# Patient Record
Sex: MALE | Race: BLACK OR AFRICAN AMERICAN | NOT HISPANIC OR LATINO | Employment: PART TIME | URBAN - METROPOLITAN AREA
[De-identification: names, ages, dates, MRNs, and addresses within clinical notes are randomized per-mention and may not be internally consistent; named-entity substitution may affect disease eponyms.]

---

## 2023-03-02 PROBLEM — N50.819 PERSISTENT TESTICULAR PAIN: Status: ACTIVE | Noted: 2023-03-02

## 2023-03-02 PROBLEM — Z98.2 VP (VENTRICULOPERITONEAL) SHUNT STATUS: Status: ACTIVE | Noted: 2023-03-02

## 2023-03-02 PROBLEM — A64 STI (SEXUALLY TRANSMITTED INFECTION): Status: ACTIVE | Noted: 2023-03-02

## 2023-03-02 PROBLEM — R35.89 POLYURIA: Status: ACTIVE | Noted: 2023-03-02

## 2023-03-02 PROBLEM — E11.9 DIABETES MELLITUS (MULTI): Status: ACTIVE | Noted: 2023-03-02

## 2023-03-02 PROBLEM — E66.9 OBESITY (BMI 30-39.9): Status: ACTIVE | Noted: 2023-03-02

## 2023-03-02 PROBLEM — R63.1 POLYDIPSIA: Status: ACTIVE | Noted: 2023-03-02

## 2023-03-02 PROBLEM — A74.9 CHLAMYDIA INFECTION: Status: ACTIVE | Noted: 2023-03-02

## 2023-03-02 PROBLEM — R30.0 DYSURIA: Status: ACTIVE | Noted: 2023-03-02

## 2023-03-02 PROBLEM — R39.9 URINARY SYMPTOM OR SIGN: Status: ACTIVE | Noted: 2023-03-02

## 2023-03-02 PROBLEM — F32.1 MODERATE MAJOR DEPRESSION (MULTI): Status: ACTIVE | Noted: 2023-03-02

## 2023-03-02 PROBLEM — B35.6 TINEA INGUINALIS: Status: ACTIVE | Noted: 2023-03-02

## 2023-03-02 RX ORDER — METFORMIN HYDROCHLORIDE 500 MG/1
1 TABLET ORAL
COMMUNITY
Start: 2021-11-22 | End: 2024-05-30 | Stop reason: SDUPTHER

## 2023-03-02 RX ORDER — CLOTRIMAZOLE AND BETAMETHASONE DIPROPIONATE 10; .5 MG/ML; MG/ML
1 LOTION TOPICAL 2 TIMES DAILY
COMMUNITY
Start: 2021-11-19

## 2023-03-02 RX ORDER — DOXYCYCLINE 100 MG/1
1 TABLET ORAL 2 TIMES DAILY
COMMUNITY
Start: 2021-09-10 | End: 2024-05-30 | Stop reason: ALTCHOICE

## 2023-03-02 RX ORDER — TAMSULOSIN HYDROCHLORIDE 0.4 MG/1
1 CAPSULE ORAL NIGHTLY
COMMUNITY
Start: 2021-09-28 | End: 2024-05-30 | Stop reason: SDUPTHER

## 2023-03-15 ENCOUNTER — OFFICE VISIT (OUTPATIENT)
Dept: PRIMARY CARE | Facility: CLINIC | Age: 33
End: 2023-03-15
Payer: MEDICARE

## 2023-03-15 ENCOUNTER — LAB (OUTPATIENT)
Dept: LAB | Facility: LAB | Age: 33
End: 2023-03-15
Payer: MEDICARE

## 2023-03-15 VITALS
BODY MASS INDEX: 40.5 KG/M2 | HEIGHT: 66 IN | HEART RATE: 72 BPM | DIASTOLIC BLOOD PRESSURE: 75 MMHG | TEMPERATURE: 98.2 F | SYSTOLIC BLOOD PRESSURE: 124 MMHG | WEIGHT: 252 LBS | OXYGEN SATURATION: 97 %

## 2023-03-15 DIAGNOSIS — E11.9 TYPE 2 DIABETES MELLITUS WITHOUT COMPLICATION, WITHOUT LONG-TERM CURRENT USE OF INSULIN (MULTI): ICD-10-CM

## 2023-03-15 DIAGNOSIS — Z13.1 DIABETES MELLITUS SCREENING: ICD-10-CM

## 2023-03-15 DIAGNOSIS — H92.01 PAIN OF MASTOID REGION, RIGHT: ICD-10-CM

## 2023-03-15 DIAGNOSIS — E66.3 EXCESS WEIGHT: ICD-10-CM

## 2023-03-15 LAB
ALBUMIN (G/DL) IN SER/PLAS: 4.4 G/DL (ref 3.4–5)
ALBUMIN (MG/L) IN URINE: 21.7 MG/L
ALBUMIN/CREATININE (UG/MG) IN URINE: 15 UG/MG CRT (ref 0–30)
ANION GAP IN SER/PLAS: 11 MMOL/L (ref 10–20)
BASOPHILS (10*3/UL) IN BLOOD BY AUTOMATED COUNT: 0.05 X10E9/L (ref 0–0.1)
BASOPHILS/100 LEUKOCYTES IN BLOOD BY AUTOMATED COUNT: 0.6 % (ref 0–2)
CALCIUM (MG/DL) IN SER/PLAS: 9.6 MG/DL (ref 8.6–10.6)
CARBON DIOXIDE, TOTAL (MMOL/L) IN SER/PLAS: 28 MMOL/L (ref 21–32)
CHLORIDE (MMOL/L) IN SER/PLAS: 105 MMOL/L (ref 98–107)
CHOLESTEROL (MG/DL) IN SER/PLAS: 119 MG/DL (ref 0–199)
CHOLESTEROL IN HDL (MG/DL) IN SER/PLAS: 48.2 MG/DL
CHOLESTEROL/HDL RATIO: 2.5
CREATININE (MG/DL) IN SER/PLAS: 0.97 MG/DL (ref 0.5–1.3)
CREATININE (MG/DL) IN URINE: 145 MG/DL (ref 20–370)
EOSINOPHILS (10*3/UL) IN BLOOD BY AUTOMATED COUNT: 0.03 X10E9/L (ref 0–0.7)
EOSINOPHILS/100 LEUKOCYTES IN BLOOD BY AUTOMATED COUNT: 0.4 % (ref 0–6)
ERYTHROCYTE DISTRIBUTION WIDTH (RATIO) BY AUTOMATED COUNT: 12.4 % (ref 11.5–14.5)
ERYTHROCYTE MEAN CORPUSCULAR HEMOGLOBIN CONCENTRATION (G/DL) BY AUTOMATED: 32.6 G/DL (ref 32–36)
ERYTHROCYTE MEAN CORPUSCULAR VOLUME (FL) BY AUTOMATED COUNT: 98 FL (ref 80–100)
ERYTHROCYTES (10*6/UL) IN BLOOD BY AUTOMATED COUNT: 4.62 X10E12/L (ref 4.5–5.9)
GFR MALE: >90 ML/MIN/1.73M2
GLUCOSE (MG/DL) IN SER/PLAS: 109 MG/DL (ref 74–99)
HEMATOCRIT (%) IN BLOOD BY AUTOMATED COUNT: 45.1 % (ref 41–52)
HEMOGLOBIN (G/DL) IN BLOOD: 14.7 G/DL (ref 13.5–17.5)
IMMATURE GRANULOCYTES/100 LEUKOCYTES IN BLOOD BY AUTOMATED COUNT: 0.3 % (ref 0–0.9)
LEUKOCYTES (10*3/UL) IN BLOOD BY AUTOMATED COUNT: 7.9 X10E9/L (ref 4.4–11.3)
LYMPHOCYTES (10*3/UL) IN BLOOD BY AUTOMATED COUNT: 1.86 X10E9/L (ref 1.2–4.8)
LYMPHOCYTES/100 LEUKOCYTES IN BLOOD BY AUTOMATED COUNT: 23.5 % (ref 13–44)
MONOCYTES (10*3/UL) IN BLOOD BY AUTOMATED COUNT: 0.65 X10E9/L (ref 0.1–1)
MONOCYTES/100 LEUKOCYTES IN BLOOD BY AUTOMATED COUNT: 8.2 % (ref 2–10)
NEUTROPHILS (10*3/UL) IN BLOOD BY AUTOMATED COUNT: 5.31 X10E9/L (ref 1.2–7.7)
NEUTROPHILS/100 LEUKOCYTES IN BLOOD BY AUTOMATED COUNT: 67 % (ref 40–80)
NON-HDL CHOLESTEROL: 71 MG/DL
NRBC (PER 100 WBCS) BY AUTOMATED COUNT: 0 /100 WBC (ref 0–0)
PHOSPHATE (MG/DL) IN SER/PLAS: 3.4 MG/DL (ref 2.5–4.9)
PLATELETS (10*3/UL) IN BLOOD AUTOMATED COUNT: 295 X10E9/L (ref 150–450)
POTASSIUM (MMOL/L) IN SER/PLAS: 4.1 MMOL/L (ref 3.5–5.3)
SODIUM (MMOL/L) IN SER/PLAS: 140 MMOL/L (ref 136–145)
UREA NITROGEN (MG/DL) IN SER/PLAS: 14 MG/DL (ref 6–23)

## 2023-03-15 PROCEDURE — 36415 COLL VENOUS BLD VENIPUNCTURE: CPT

## 2023-03-15 PROCEDURE — 82465 ASSAY BLD/SERUM CHOLESTEROL: CPT

## 2023-03-15 PROCEDURE — 85025 COMPLETE CBC W/AUTO DIFF WBC: CPT

## 2023-03-15 PROCEDURE — 83036 HEMOGLOBIN GLYCOSYLATED A1C: CPT

## 2023-03-15 PROCEDURE — 80069 RENAL FUNCTION PANEL: CPT

## 2023-03-15 PROCEDURE — 3044F HG A1C LEVEL LT 7.0%: CPT

## 2023-03-15 PROCEDURE — 99214 OFFICE O/P EST MOD 30 MIN: CPT

## 2023-03-15 PROCEDURE — 82570 ASSAY OF URINE CREATININE: CPT

## 2023-03-15 PROCEDURE — 3078F DIAST BP <80 MM HG: CPT

## 2023-03-15 PROCEDURE — 83718 ASSAY OF LIPOPROTEIN: CPT

## 2023-03-15 PROCEDURE — 3074F SYST BP LT 130 MM HG: CPT

## 2023-03-15 PROCEDURE — 3008F BODY MASS INDEX DOCD: CPT

## 2023-03-15 PROCEDURE — 82043 UR ALBUMIN QUANTITATIVE: CPT

## 2023-03-15 ASSESSMENT — PAIN SCALES - GENERAL: PAINLEVEL: 6

## 2023-03-15 NOTE — PROGRESS NOTES
"Subjective   Patient ID: Palomo Bryant is a 32 y.o. male who presents for Follow-up.      HPI   ZAN Bryant is a 29 years old man with PMH significant for prematurity (27 weeks); Dandy-Walker malformation; MERRF Syndrome; hydrocephalus 2/2 IVH s/p  shunt placement w/ revision in 2010 now presenting with a headache and concern for shunt malfunction.      # headache   - I/s/o  shunt placement with revision in 2010  - denies trauma to the  head.   - endorses throbbing headache that is intermittent and lasts all day.   - denies similar pain in the past but does endorse regular migraines.   - localizes pain to the  postero-auricular region at the site where  shunt catheter runs.   - pain refractive to extra strength tylenol.   - denies any fever, chills, or other systemic symptoms.   - denies otalgia, otorrhea, changes in hearing.   - denies any red flag symptoms of increased intracranial pressure such as N/V, vision changes, seizures, drowsiness, AMS, dyspnea, or loss of consciousness.       Review of Systems  10 point ROS negative except as stated in HPI     Objective   /75 (BP Location: Right arm, Patient Position: Sitting)   Pulse 72   Temp 36.8 °C (98.2 °F) (Temporal)   Ht 1.676 m (5' 6\")   Wt 114 kg (252 lb)   SpO2 97%   BMI 40.67 kg/m²     Physical Exam  Gen: NAD, nontox  HEENT: NC/AT, no edema or erythema around  shunt site, no induration. b/l external ear w/o swelling, redness, or tenderness. No pain on palpation, Patent ear canals. TM intact and pearly grey in color. No fluid or discharge noted.   RESP: no resp distress, talks in full sentences, CTABL  CVS: non-tachycardic, RRR  ABD: Soft, non-distended  Psych: appropriately answers questions. normal mood and affect  MSK: appears to have Full range of motion on all extremities.     Assessment/Plan     ZAN Bryant is a 33 yo M w/ PMH significant for prematurity (27 weeks); Dandy-Walker malformation; MERRF Syndrome; hydrocephalus 2/2 IVH " s/p  shunt placement w/ revision in 2010 who presents to the clinic for headache at site of  shunt catheter.     #  shunt catheter site pain   - PE reassuring with no signs of infection. No erythema, induration, edema, or injury noted.  - no red flag symptoms of increased ICP ( AMS, N/V, vision changes, seizures, lethargy, LOC, respiratory distress). No indication for urgent treatment.   - pt referred to neurosurgery   - CBW wth diff ordered to r/o leukocytosis as a marker of infeciton   - per pt he had had initial  shunt placed by Dr. Hwang     # BMI of 40.67   - Class III obesity   - discussed lifestyle interventions to promote weight loss   - discussed setting achievable goals: Helped the individual to set realistic goals for weight loss and nutritional improvements, and created a plan that gradually incorporates healthy changes.  - Discussed calorie control: Reduce calorie intake to create a calorie deficit for weight loss. This can be achieved through portion control, reducing intake of high-calorie foods and beverages, and increasing consumption of fruits, vegetables, and whole grains.  - Balanced diet: Encouraged a balanced diet that includes a variety of nutrients, such as lean proteins, healthy fats, and complex carbohydrates.  - Recommended regular exercise, including both cardiovascular and strength-training activities, to promote weight loss and improve overall health.  - Encouraged mindful eating: Encourage mindful eating practices, such as paying attention to hunger and fullness cues, eating slowly and without distraction, and enjoying the taste and texture of food.  - Recommended adequate sleep duration of 7-8 hours a day as inadequate sleep can disrupt hormones responsible for hunger and metabolism.  - Ordered A1c to screen for DM in the setting of elevated BMI   - Ordered lipid panel   - Ordered RFP and urine albumin to establish renal function I/s/o obesity and increased risk for  metabolic syndrome and inflammatory factors.     Seen and discussed with Dr. Wendy Hutton MD, MPH   Family Medicine and Preventive Health, PGY-1

## 2023-03-16 LAB
ESTIMATED AVERAGE GLUCOSE FOR HBA1C: 128 MG/DL
HEMOGLOBIN A1C/HEMOGLOBIN TOTAL IN BLOOD: 6.1 %

## 2023-03-27 NOTE — PROGRESS NOTES
I saw and evaluated the patient. I personally obtained the key and critical portions of the history and physical exam or was physically present for key and critical portions performed by the resident/fellow. I reviewed the resident/fellow's documentation and discussed the patient with the resident/fellow. I agree with the resident/fellow's medical decision making as documented in the note.    Anais Nguyen MD

## 2023-07-07 ENCOUNTER — DOCUMENTATION (OUTPATIENT)
Dept: PRIMARY CARE | Facility: CLINIC | Age: 33
End: 2023-07-07
Payer: MEDICARE

## 2023-07-07 PROBLEM — G47.33 OBSTRUCTIVE SLEEP APNEA: Status: ACTIVE | Noted: 2023-07-07

## 2023-07-07 PROBLEM — R09.2 RESPIRATORY ARREST (MULTI): Status: ACTIVE | Noted: 2023-07-07

## 2023-07-07 PROBLEM — T40.2X1A OPIOID OVERDOSE (MULTI): Status: ACTIVE | Noted: 2023-07-07

## 2023-07-07 PROBLEM — F10.929 ALCOHOLIC INTOXICATION WITH COMPLICATION (CMS-HCC): Status: ACTIVE | Noted: 2023-07-07

## 2023-07-07 PROBLEM — F12.129: Status: ACTIVE | Noted: 2023-07-07

## 2023-07-07 NOTE — PROGRESS NOTES
I received a message from his insurance company that he appeared not to have filled prescriptions for antibiotics  (augmentin and doxycycline, probably for aspiration pneumonia) with which he was discharged from a hospital admission.  I looked up his records and found that he was hospitalized May 21, 2023 for 3-4 days at [facility] (not in  system).  I tried to call the phone number we have on file, but was sent immediately to voicemail, so I left a message asking him to call our office for an appointment if he is not being followed elsewhere.       From the hospital records, readable only in HIE:  He reportedly was at a party when he suffered a respiratory arrest.  He received narcan in the field.  He was intubated by medics and had full arrest at that time, but was resuscitated.   He was treated in the ED for possible sepsis, respiratory arrest, [place], hypokalemia, prolonged QT interval, troponemia without MI.  His alcohol level was very high at 334.   His urine was positive for cannabinoids and fentanyl (confirmed).  He was treated for aspiration pneumonia.  Blood cultures did not grow organisms.  He had nasal carriage of staph aureus MRSA.  He received sliding scale insulin.  He was extubated the next day but diagnosed with Obstructive sleep apnea.  [place] and hypokalemia resolved.  A1C was 6.1%.  At discharge, he was planning to get CPAP, treatment for alcohol use disorder and drug abuse and depression. The followup appointments were not listed in the documents that I could see.     Note that metformin in combination with alcohol or respiratory depression may increase risk of lactic acidosis.  A different treatment for his diabetes may be preferable.   I was unable to reach him.  He does not have access to patient protal nor an email address.  I hope he will schedule followup.

## 2024-01-30 ENCOUNTER — OFFICE VISIT (OUTPATIENT)
Dept: PRIMARY CARE | Facility: CLINIC | Age: 34
End: 2024-01-30
Payer: MEDICARE

## 2024-01-30 VITALS — OXYGEN SATURATION: 98 % | WEIGHT: 260 LBS | BODY MASS INDEX: 40.81 KG/M2 | RESPIRATION RATE: 18 BRPM | HEIGHT: 67 IN

## 2024-01-30 DIAGNOSIS — E88.42 MERRF (MYOCLONUS EPILEPSY AND RAGGED RED FIBERS) (MULTI): ICD-10-CM

## 2024-01-30 DIAGNOSIS — Z00.00 HEALTHCARE MAINTENANCE: ICD-10-CM

## 2024-01-30 DIAGNOSIS — E66.01 OBESITY, MORBID (MULTI): ICD-10-CM

## 2024-01-30 DIAGNOSIS — G40.309 MERRF (MYOCLONUS EPILEPSY AND RAGGED RED FIBERS) (MULTI): ICD-10-CM

## 2024-01-30 DIAGNOSIS — R63.1 POLYDIPSIA: ICD-10-CM

## 2024-01-30 DIAGNOSIS — E11.69 TYPE 2 DIABETES MELLITUS WITH OTHER SPECIFIED COMPLICATION, WITHOUT LONG-TERM CURRENT USE OF INSULIN (MULTI): Primary | ICD-10-CM

## 2024-01-30 DIAGNOSIS — G47.33 OBSTRUCTIVE SLEEP APNEA SYNDROME: ICD-10-CM

## 2024-01-30 DIAGNOSIS — T40.2X1S OPIOID OVERDOSE, ACCIDENTAL OR UNINTENTIONAL, SEQUELA: ICD-10-CM

## 2024-01-30 DIAGNOSIS — F32.1 MODERATE MAJOR DEPRESSION (MULTI): ICD-10-CM

## 2024-01-30 DIAGNOSIS — E55.9 HYPOVITAMINOSIS D: ICD-10-CM

## 2024-01-30 DIAGNOSIS — Z23 NEED FOR VACCINATION: ICD-10-CM

## 2024-01-30 DIAGNOSIS — I46.9 CARDIAC ARREST, CAUSE UNSPECIFIED (MULTI): ICD-10-CM

## 2024-01-30 DIAGNOSIS — F10.929 ALCOHOLIC INTOXICATION WITH COMPLICATION (CMS-HCC): ICD-10-CM

## 2024-01-30 PROCEDURE — 36415 COLL VENOUS BLD VENIPUNCTURE: CPT

## 2024-01-30 PROCEDURE — 82043 UR ALBUMIN QUANTITATIVE: CPT

## 2024-01-30 PROCEDURE — 80053 COMPREHEN METABOLIC PANEL: CPT

## 2024-01-30 PROCEDURE — 3044F HG A1C LEVEL LT 7.0%: CPT | Performed by: INTERNAL MEDICINE

## 2024-01-30 PROCEDURE — 82306 VITAMIN D 25 HYDROXY: CPT

## 2024-01-30 PROCEDURE — 99213 OFFICE O/P EST LOW 20 MIN: CPT | Performed by: INTERNAL MEDICINE

## 2024-01-30 PROCEDURE — 83036 HEMOGLOBIN GLYCOSYLATED A1C: CPT

## 2024-01-30 PROCEDURE — 3008F BODY MASS INDEX DOCD: CPT | Performed by: INTERNAL MEDICINE

## 2024-01-30 PROCEDURE — 80061 LIPID PANEL: CPT

## 2024-01-30 PROCEDURE — 82570 ASSAY OF URINE CREATININE: CPT

## 2024-01-30 PROCEDURE — 3060F POS MICROALBUMINURIA REV: CPT | Performed by: INTERNAL MEDICINE

## 2024-01-30 PROCEDURE — 3048F LDL-C <100 MG/DL: CPT | Performed by: INTERNAL MEDICINE

## 2024-01-30 ASSESSMENT — ENCOUNTER SYMPTOMS
NUMBNESS: 0
FATIGUE: 0
SHORTNESS OF BREATH: 0
CONSTIPATION: 0
SORE THROAT: 0
CHEST TIGHTNESS: 0
DIARRHEA: 0
WEAKNESS: 0
CHILLS: 0
AGITATION: 0
SINUS PRESSURE: 0
HEMATURIA: 0
PALPITATIONS: 0
MYALGIAS: 0
ACTIVITY CHANGE: 0
FREQUENCY: 1

## 2024-01-30 ASSESSMENT — PAIN SCALES - GENERAL: PAINLEVEL: 0-NO PAIN

## 2024-01-30 NOTE — PROGRESS NOTES
Primary care office Note- Dr. Osman Anderson      Name: Palomo Iqbal, Age: 33 y.o., Gender: male, MRN: [id number]   Pharmacy:   [id number] - [place], [place] - [address]  [place] [address]  Phone: [phone] Fax: [phone]     PCP: Osman Anderson        Subjective:     No chief complaint on file.      HPI:   Palomo Iqbal is a 33 y.o. male that presents for PCP establishment.  PMH significant for prematurity (27 weeks); Dandy-Walker malformation; MERRF Syndrome; hydrocephalus 2/2 IVH s/p  shunt placement w/ revision in 2010.  The patient lives in a group home and works at [employer].  History of borderline diabetes.  Had been on metformin but never taken it. Original diagnosed in 2020.  He has been urinating much more.  He admits to having a poor diet including a lot of fried food and fast food.  He gets a good amount of exercise by way of walking and his job at the restaurant.  His caregiver reports he snores loudly and seems to have frequent apneic episodes.  He is not bothered by this and does not report any energy problems.  Also he has no daytime sleepiness.    ROS  Review of Systems   Constitutional:  Negative for activity change, chills and fatigue.   HENT:  Negative for congestion, sinus pressure and sore throat.    Respiratory:  Negative for chest tightness and shortness of breath.    Cardiovascular:  Negative for chest pain and palpitations.   Gastrointestinal:  Negative for constipation and diarrhea.   Genitourinary:  Positive for frequency. Negative for hematuria.   Musculoskeletal:  Negative for myalgias.   Neurological:  Negative for weakness and numbness.   Psychiatric/Behavioral:  Negative for agitation and behavioral problems.         Medical History      PMH:    has a past medical history of Headache, unspecified (05/29/2015) and Personal history of other diseases of the musculoskeletal system and connective tissue  (07/14/2014).   Allergies:   No Known Allergies   Surgical Hx:   No past surgical history on file.   Social HX:   Social History     Tobacco Use    Smoking status: Some Days     Packs/day: 1     Types: Cigarettes    Smokeless tobacco: Current   Substance Use Topics    Alcohol use: Never    Drug use: Never        MEDS:   Current Outpatient Medications   Medication Instructions    clotrimazole-betamethasone (Lotrisone) lotion 1 Application, Topical, 2 times daily    doxycycline (Adoxa) 100 mg tablet 1 tablet, oral, 2 times daily    metFORMIN (Glucophage) 500 mg tablet 1 tablet, oral, Every Day    tamsulosin (Flomax) 0.4 mg 24 hr capsule 1 capsule, oral, Nightly        Objective Data     Objective:   Visit Vitals  Resp 18        Physical Examination:   Physical Exam  Constitutional:       Appearance: Normal appearance.   HENT:      Head: Atraumatic.      Nose: Nose normal.      Mouth/Throat:      Mouth: Mucous membranes are moist.   Eyes:      Extraocular Movements: Extraocular movements intact.      Pupils: Pupils are equal, round, and reactive to light.   Cardiovascular:      Rate and Rhythm: Normal rate and regular rhythm.   Pulmonary:      Effort: No respiratory distress.      Breath sounds: No wheezing.   Abdominal:      General: Bowel sounds are normal.      Palpations: Abdomen is soft.   Neurological:      General: No focal deficit present.      Motor: No weakness.      Gait: Gait normal.   Psychiatric:         Mood and Affect: Mood normal. Affect is blunt.         Speech: Speech is delayed.         Behavior: Behavior normal.         Cognition and Memory: Cognition is impaired.         Judgment: Judgment normal. Judgment is not impulsive or inappropriate.          Last Labs:   CBC:   WBC   Date Value Ref Range Status   03/15/2023 7.9 4.4 - 11.3 x10E9/L Final   11/19/2021 7.6 4.4 - 11.3 x10E9/L Final     Hemoglobin   Date Value Ref Range Status   03/15/2023 14.7 13.5 - 17.5 g/dL Final   11/19/2021 15.4 13.5 -  17.5 g/dL Final     MCV   Date Value Ref Range Status   03/15/2023 98 80 - 100 fL Final   11/19/2021 102 (H) 80 - 100 fL Final     Platelets   Date Value Ref Range Status   03/15/2023 295 150 - 450 x10E9/L Final   11/19/2021 254 150 - 450 x10E9/L Final      CMP:   Sodium   Date Value Ref Range Status   01/30/2024 144 136 - 145 mmol/L Final   03/15/2023 140 136 - 145 mmol/L Final   11/19/2021 141 136 - 145 mmol/L Final     Potassium   Date Value Ref Range Status   01/30/2024 4.5 3.5 - 5.3 mmol/L Final   03/15/2023 4.1 3.5 - 5.3 mmol/L Final   11/19/2021 5.2 3.5 - 5.3 mmol/L Final     Comment:     MILD HEMOLYSIS DETECTED. The result may be falsely elevated due to  hemolysis or other interferents. Clinical correlation is recommended.  Repeat testing may be considered.       Chloride   Date Value Ref Range Status   01/30/2024 104 98 - 107 mmol/L Final   03/15/2023 105 98 - 107 mmol/L Final   11/19/2021 103 98 - 107 mmol/L Final     Urea Nitrogen   Date Value Ref Range Status   01/30/2024 15 6 - 23 mg/dL Final   03/15/2023 14 6 - 23 mg/dL Final   11/19/2021 11 6 - 23 mg/dL Final     Creatinine   Date Value Ref Range Status   01/30/2024 1.11 0.50 - 1.30 mg/dL Final   03/15/2023 0.97 0.50 - 1.30 mg/dL Final   11/19/2021 1.13 0.50 - 1.30 mg/dL Final     eGFR   Date Value Ref Range Status   01/30/2024 >90 >60 mL/min/1.73m*2 Final     Comment:     Calculations of estimated GFR are performed using the 2021 CKD-EPI Study Refit equation without the race variable for the IDMS-Traceable creatinine methods.  https://jasn.asnjournals.org/content/early/2021/09/22/ASN.3682865783      A1c:   Hemoglobin A1C   Date Value Ref Range Status   01/30/2024 6.2 (H) see below % Final   03/15/2023 6.1 (A) % Final     Comment:          Diagnosis of Diabetes-Adults   Non-Diabetic: < or = 5.6%   Increased risk for developing diabetes: 5.7-6.4%   Diagnostic of diabetes: > or = 6.5%  .       Monitoring of Diabetes                Age (y)      Therapeutic Goal (%)   Adults:          >18           <7.0   Pediatrics:    13-18           <7.5                   7-12           <8.0                   0- 6            7.5-8.5   American Diabetes Association. Diabetes Care 33(S1), Jan 2010.   11/19/2021 6.7 (A) % Final     Comment:          Diagnosis of Diabetes-Adults   Non-Diabetic: < or = 5.6%   Increased risk for developing diabetes: 5.7-6.4%   Diagnostic of diabetes: > or = 6.5%  .       Monitoring of Diabetes                Age (y)     Therapeutic Goal (%)   Adults:          >18           <7.0   Pediatrics:    13-18           <7.5                   7-12           <8.0                   0- 6            7.5-8.5   American Diabetes Association. Diabetes Care 33(S1), Jan 2010.          Other labs;   Vit D:   Vitamin D, 25-Hydroxy, Total   Date Value Ref Range Status   01/30/2024 8 (L) 30 - 100 ng/mL Final      TSH:  TSH   Date Value Ref Range Status   10/29/2019 2.20 0.44 - 3.98 mIU/L Final     Comment:      TSH testing is performed using different testing    methodology at [facility] than at other    [facility]. Direct result comparisons should    only be made within the same method.  .   Patients receiving more than 5 mg/day of biotin may have interference   in test results.  A sample should be taken no sooner than eight hours   after  previous dose. Contact [phone] for additional information.          Assessment and Plan     Problem List Items Addressed This Visit       Diabetes mellitus (CMS/HCC) - Primary    Relevant Orders    Albumin , Urine Random (Completed)    Comprehensive Metabolic Panel (Completed)    Hemoglobin A1C (Completed)    Lipid Panel (Completed)    Referral to Ophthalmology    Referral to Podiatry    Moderate major depression (CMS/HCC)    Polydipsia    Relevant Orders    Hemoglobin A1C (Completed)    Alcoholic intoxication with complication (CMS/HCC)    Opioid overdose (CMS/HCC)    Cardiac arrest, cause unspecified  (CMS/[facility])    Obesity, morbid (CMS/[facility])     Other Visit Diagnoses       Healthcare maintenance        Hypovitaminosis D        Relevant Orders    Vitamin D 25-Hydroxy,Total (for eval of Vitamin D levels) (Completed)    Obstructive sleep apnea syndrome        Relevant Orders    Referral to Adult Sleep Medicine    Need for vaccination        Relevant Orders    Pneumococcal conjugate vaccine, 20-valent (PREVNAR 20)        We will screen him again for diabetes and lisinopril medication as indicated.  Will also refer him to sleep medicine to screen for sleep apnea.  He is currently not on any opioids and has a low risk for opioid overdose.  He does not have access to alcohol in the group home.  He has some no signs of depression    Osman Anderson MD

## 2024-01-31 PROBLEM — I46.9 CARDIAC ARREST, CAUSE UNSPECIFIED (MULTI): Status: ACTIVE | Noted: 2024-01-31

## 2024-01-31 PROBLEM — E88.42: Status: ACTIVE | Noted: 2024-01-31

## 2024-01-31 PROBLEM — E66.01 OBESITY, MORBID (MULTI): Status: ACTIVE | Noted: 2024-01-31

## 2024-01-31 PROBLEM — G40.309: Status: ACTIVE | Noted: 2024-01-31

## 2024-01-31 LAB
25(OH)D3 SERPL-MCNC: 8 NG/ML (ref 30–100)
ALBUMIN SERPL BCP-MCNC: 4.2 G/DL (ref 3.4–5)
ALP SERPL-CCNC: 47 U/L (ref 33–120)
ALT SERPL W P-5'-P-CCNC: 16 U/L (ref 10–52)
ANION GAP SERPL CALC-SCNC: 14 MMOL/L (ref 10–20)
AST SERPL W P-5'-P-CCNC: 16 U/L (ref 9–39)
BILIRUB SERPL-MCNC: 0.3 MG/DL (ref 0–1.2)
BUN SERPL-MCNC: 15 MG/DL (ref 6–23)
CALCIUM SERPL-MCNC: 9.6 MG/DL (ref 8.6–10.6)
CHLORIDE SERPL-SCNC: 104 MMOL/L (ref 98–107)
CHOLEST SERPL-MCNC: 130 MG/DL (ref 0–199)
CHOLESTEROL/HDL RATIO: 2.9
CO2 SERPL-SCNC: 31 MMOL/L (ref 21–32)
CREAT SERPL-MCNC: 1.11 MG/DL (ref 0.5–1.3)
CREAT UR-MCNC: 116.9 MG/DL (ref 20–370)
EGFRCR SERPLBLD CKD-EPI 2021: >90 ML/MIN/1.73M*2
EST. AVERAGE GLUCOSE BLD GHB EST-MCNC: 131 MG/DL
GLUCOSE SERPL-MCNC: 102 MG/DL (ref 74–99)
HBA1C MFR BLD: 6.2 %
HDLC SERPL-MCNC: 44.6 MG/DL
LDLC SERPL CALC-MCNC: 60 MG/DL
MICROALBUMIN UR-MCNC: 69.4 MG/L
MICROALBUMIN/CREAT UR: 59.4 UG/MG CREAT
NON HDL CHOLESTEROL: 85 MG/DL (ref 0–149)
POTASSIUM SERPL-SCNC: 4.5 MMOL/L (ref 3.5–5.3)
PROT SERPL-MCNC: 7 G/DL (ref 6.4–8.2)
SODIUM SERPL-SCNC: 144 MMOL/L (ref 136–145)
TRIGL SERPL-MCNC: 126 MG/DL (ref 0–149)
VLDL: 25 MG/DL (ref 0–40)

## 2024-02-02 DIAGNOSIS — E11.69 TYPE 2 DIABETES MELLITUS WITH OTHER SPECIFIED COMPLICATION, WITHOUT LONG-TERM CURRENT USE OF INSULIN (MULTI): Primary | ICD-10-CM

## 2024-02-02 RX ORDER — LOSARTAN POTASSIUM 25 MG/1
25 TABLET ORAL DAILY
Qty: 90 TABLET | Refills: 3 | Status: SHIPPED | OUTPATIENT
Start: 2024-02-02 | End: 2025-02-01

## 2024-02-08 PROBLEM — E66.9 OBESITY (BMI 30-39.9): Status: RESOLVED | Noted: 2023-03-02 | Resolved: 2024-02-08

## 2024-03-18 NOTE — PATIENT INSTRUCTIONS
[facility] Sleep Medicine  [facility] [place]  [address]  [facility] [address] [phone]       Thank you for coming to the Sleep Medicine Clinic today! Your sleep medicine provider today was: BOO Cazares Below is a summary of your treatment plan, patient education, other important information, and our contact numbers.    Dear Palomo Iqbal,    Thank you for visiting  Sleep Medicine Clinic !     1. We will proceed with a SPLIT NIGHT sleep study to check your risk of sleep apnea. The lab will call you and schedule it for you.     2. Please do not drive when you are sleepy and  start practicing the sleep hygiene  as discussed in clinic today.     3. Your blood pressure was elevated in the office today. Please obtain a home blood pressure monitoring kit and log your blood pressures at home and follow up with your primary care physician.    4.  FOR QUESTIONS AND CONCERNS:   a) : To schedule, cancel, or reschedule SLEEP STUDY appointments, please call [phone]  b): Please call my office with issues or questions: [phone] ([place]); [phone] ([facility])    If you have a CPAP or BiPAP machine at home, please bring the unit and all accessories including the power cord to your appointments unless I tell you otherwise. Please have knowledge of the DME company you worked with to receive your PAP device. If you have copies of any previous sleep testing completed outside of , please bring with you to clinic as well. This information will make our visits more productive.     If you are new to CPAP or BiPAP, please note the minimum usage insurance requires to continuing coverage for the equipment as noted by your DME company. Please discuss equipment issues (PAP unit, mask fit, humidification, etc.) with your DME company first.       In the event that you are running more than 10 minutes late to your appointment, I will kindly ask you to  reschedule. Thanks.      TREATMENT PLAN     Follow-up Appointment:   Follow-up in 2-3 weeks after sleep study.    PATIENT EDUCATION     Obstructive Sleep Apnea (TACHO) is a sleep disorder where your upper airway muscles relax during sleep and the airway intermittently and repetitively narrows and collapses leading to partially blocked airway (hypopnea) or completely blocked airway (apnea) which, in turn, can disrupt breathing in sleep, lower oxygen levels while you sleep and cause night time wakings. Because both apnea and hypopnea may cause higher carbon dioxide or low oxygen levels, untreated TACHO can lead to heart arrhythmia, elevation of blood pressure, and make it harder for the body to consolidate memory and facilitate metabolism (leading to higher blood sugars at night). Frequent partial arousals occur during sleep resulting in sleep deprivation and daytime sleepiness. TACHO is associated with an increased risk of cardiovascular disease, stroke, hypertension, and insulin resistance. Moreover, untreated TACHO with excessive daytime sleepiness can increase the risk of motor vehicular accidents.    Some conservative strategies for TACHO regardless of TACHO severity are:   Positional therapy - Avoid sleeping on your back.   Healthy diet and regular exercise to optimize weight is highly encouraged.   Avoid alcohol late in the evening and sedative-hypnotics as these substances can make sleep apnea worse.   Improve breathing through the nose with intranasal steroid spray, saline rinse, or antihistamines    Safety: Avoid driving vehicle and operating heavy equipment while sleepy. Drowsy driving may lead to life-threatening motor vehicle accidents. A person driving while sleepy is 5 times more likely to have an accident. If you feel sleepy, pull over and take a short power nap (sleep for less than 30 minutes). Otherwise, ask somebody to drive you.    Treatment options for sleep apnea include weight management, positional  therapy, Positive Airway Therapy (PAP) therapy, oral appliance therapy, hypoglossal nerve stimulator (Inspire) and select airway surgeries.    Sleep Testing for sleep apnea: The best and ideal way to check out if you have sleep apnea is to do an overnight sleep study in the sleep laboratory. Alternatively, a home sleep apnea test can also be done depending on your insurance and risk factors.     If you are having a home sleep apnea test, kindly allot 1 hour during pickup of the testing kit as you will have to complete paperwork and listen to the sleep technician for in-person on-the-spot demonstration and instructions on how to hook up the testing kit at home. Do the test for 1 day and start off with sleeping on your back. If you sleep on your side in the middle of night or you have always been a side or stomach-sleeper, it is ok as long as you have some time on your back and off-back.     If you are having an overnight in-lab sleep study, please make sure to bring toiletries, a comfy pillow, additional warm blankets, and any nighttime medications (include as-needed inhaler, pain pill, etc) that you may regularly take. Also, be sure to eat dinner before you arrive as we generally do not provide meals inside the sleep testing center. Lastly, in order to fall asleep faster in the sleep testing center, we advise patients to wake up 2 hours earlier on the morning of scheduled testing and avoid napping 2 days prior testing. Sometimes, your sleep provider may prescribe a sleep aid to be taken at lights out in the sleep testing center. If you are taking a sleep aid, consider having somebody pick you up after the sleep testing.    Overnight sleep studies may be scheduled on a weekday or weekend. We also perform daytime testing for shift workers on a case-by-case basis.    Once you have booked an appointment to do the sleep study, please contact my office for follow-up visit to discuss results.    On the other hand, if you  have any of the following, please consider calling the sleep testing center to RESCHEDULE your sleep study appointment:  If you tested positive for COVID within 10 days of your sleep study appointment.  If you were exposed to somebody who was confirmed for COVID within 10 days of your sleep study appointment and now you are having symptoms of possible COVID  If you have fever>100F or any acute symptoms that you think will lead to poor sleep during testing (e.g. new or worsening stuffy nose not relieved by steroid nasal spray)  If you have traveled domestically or internationally in the last month and now you are having symptoms of possible COVID    You can also go to the following EDUCATION WEBSITES for further information:   American Academy of Sleep Medicine http://sleepeducation.org  National Sleep Foundation: https://sleepfoundation.org  American Sleep Apnea Association: https://www.sleepapnea.org (for patients with sleep apnea)  Narcolepsy Network: https://www.narcolepsynetwork.org (for patients with narcolepsy)  [employer]: https://www.wakeupnarcolepsy.org (for patients with narcolepsy)  Hypersomnia Foundation: https://www.hypersomniafoundation.org (for patients with idiopathic hypersomnia)  RLS foundation: https://www.rls.org (for patients with restless leg syndrome)    IMPORTANT INFORMATION     Call 911 for medical emergencies.  Our offices are generally open from Monday-Friday, 8 am - 5 pm.   There are no supporting services by either the sleep doctors or their staff on weekends and Holidays, or after 5 PM on weekdays.   If you need to get in touch with me, you may either call my office number or you can use [employer].  If a referral for a test, for CPAP, or for another specialist was made, and you have not heard about scheduling this within a week, please call scheduling at [phone]).  If you are unable to make your appointment for clinic or an overnight study, kindly call the office or  sleep testing center at least 48 hours in advance to cancel and reschedule.  If you are on CPAP, please bring your device's card and/or the device to each clinic appointment.   In case of problems with PAP machine or mask interface, please contact your DME (Durable Medical Equipment) company first. DME is the company who provides you the machine and/or PAP supplies.       PRESCRIPTIONS     We require 7 days advanced notice for prescription refills. If we do not receive the request in this time, we cannot guarantee that your medication will be refilled in time.    IMPORTANT PHONE NUMBERS     Sleep Medicine Clinic Fax: [phone]  Appointments (for Pediatric Sleep Clinic): [phone] - option 1  Appointments (for Adult Sleep Clinic): [phone] - option 2  Appointments (For Sleep Studies): [phone] - option 3  Behavioral Sleep Medicine: [phone]  Sleep Surgery: [phone]  Nutrition Service: [phone]  Weight management clinics with endocrinology: [phone]  Bariatric Services: [phone] (includes weight loss medications and weight loss surgery)  [facility]: [phone] (offers holistic approaches to weight management)  ENT (Otolaryngology): [phone]  Headache Clinic (Neurology): [phone]  Neurology: [phone]  Psychiatry: [phone]  Pulmonary Function Testing (PFT) Center: [phone]  Pulmonary Medicine: [phone]  [employer] (DME): [phone]      OUR SLEEP TESTING LOCATIONS     Our team will contact you to schedule your sleep study, however, you can contact us as follow:  Main Phone Line (scheduling only): [phone]), option 3  Adult and Pediatric Locations  [facility] (6 years and older): [facility] - 4th floor ([address] [place] [address]) After hours line: [phone]  [facility] (Main campus: All ages): [facility], 6th floor. After hours  "line: [phone]   [place] (5 years and older; younger considered on case-by-case basis): [address]; [address], [address]. Scheduling  After hours line: [phone]   [place] (6 years and older): [address]; [address]; [address]   [place] (6 years and older): [address] [facility], [address]  After hours line: [phone]   [place] (13 years and older) in [place]: [address], 2nd floor  After hours line: [phone]   [place] (13 year and older): [address]  After hours line: [phone]     Adult Only Locations:   [place] (18 years and older): [address] [facility], 2nd floor   [place] (18 years and older): [address] [facility]; 4th floor  After hours line: [phone]  [facility] (18 years and older) at [place]: [address] [place] [address]  After hours line: [phone]     CONTACTING YOUR SLEEP MEDICINE PROVIDER AND SLEEP TEAM      For issues with your machine or mask interface, please call your DME provider first. DME stands for durable medical company. DME is the company who provides you the machine and/or PAP supplies / accessories.   To schedule, cancel, or reschedule SLEEP STUDY APPOINTMENTS, please call the Main Phone Line at [phone] - option 3.   To schedule, cancel, or reschedule CLINIC APPOINTMENTS, you can do it in \"[employer]\", call [phone] to speak with my  (Estefania Gonzalez), or call the Main Phone Line at [phone] - option 2  For CLINICAL QUESTIONS or MEDICATION REFILLS, please call direct line for Adult Sleep Nurses at [phone].   Lastly, you can also send a message directly to your provider through \"My Chart\", which is the email service through your  Records Account: [email or web address]       Here at [facility], we wish you a restful sleep!   "

## 2024-03-18 NOTE — PROGRESS NOTES
[facility] Sleep Medicine  [facility]  [address]  [facility] [address]  [phone]     [facility] Sleep Medicine Clinic  New Visit Note      Subjective   Patient ID: Palomo Iqbal is a 33 y.o. male with past medical history significant for Morbid Obesity, Depression, Cannabis abuse, Opiod overdose, and Obstructive sleep apnea.    3/25/2024: The patient is here alone today and was referred by PCP Osman Anderson MD, for a comprehensive sleep medicine evaluation due to excessive daytime sleepiness/fatigue and inability to tolerate CPAP. He used CPAP for one year but stopped using it because of mask intolerance and feeling stupid. He has no machine at home now, and he thinks that he needs to reestablish care to treat his Obstructive sleep apnea symptoms. ESS: 16, and TANISHA: 11 today.    HPI  Patient had been having these symptoms for the past 5-6 years. Patient had sleep study done in the past but no available records.       SLEEP STUDY HISTORY: (personally reviewed raw data such as interpretation report, data sheet, hypnogram, and titration table if available and applicable)  Not on file-ask the patient to sign a medical release form     SLEEP-WAKE SCHEDULE  Bedtime: 9 PM on weekdays, same on weekends  Subjective sleep latency: 10-20 minutes  Problems falling asleep: no  Number of awakenings: 3 times per night spontaneously for urination  Falls back asleep in 10 minutes  Problems staying asleep: no  Final wake time: 10 AM on weekdays, same on weekends  Out of bed time: 11 AM on weekdays, same on weekends  Shift work: no  Naps: Yes  Feels rested after a nap: Yes   Average sleep duration (excluding naps): 12 hours    SLEEP ENVIRONMENT  Sleep location: bed  Sleep status: sleeps alone  Room is dark:  Yes  Room is quiet: Yes  Room is cool: Yes  Bed comfort: good    SLEEP HABITS:   Activities before bedtime: watch TV  Activities in bed:  no  Preferred sleep position: back    SLEEP ROS:  Night symptoms: POSITIVE for snoring, witnessed apnea, and nocturia  Morning symptoms: POSITIVE for unrefreshing sleep  Daytime symptoms POSITIVE for excessive daytime sleepiness and fatigue  Hypersomnia / narcolepsy symptoms: Patient denies symptoms of a hypersomnolence disorder such as sleep paralysis, sleep-related hallucinations, recurrent sleep attacks, automatic behaviors, and cataplexy.   RLS symptoms: Patient denies RLS symptoms.  Movements in sleep: Patient denies problematic movements in sleep.  Parasomnia symptoms: Patient denies symptoms of parasomnia.    WEIGHT: stable    REVIEW OF SYSTEMS: All other systems have been reviewed and are negative.    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    Active Problems, Allergy List, Medication List, and PMH/PSH/FH/Social Hx have been reviewed and reconciled in chart. No significant changes unless documented in the pertinent chart section. Updates made when necessary.       Objective   Vitals:    03/25/24 0912   BP: 146/88   Pulse: 71   Temp: 36.4 °C (97.6 °F)   SpO2: 97%        Physical Exam  Constitutional:alert and oriented to time, place, and person  Sinus: - tenderness to palpation  Palate:  Narrow Mallampati 3, - Tongue scalloping, - macroglossia  Lungs: Clear to auscultation bilateral, no rales  Heart: Regular rate and rhythm, no murmurs    Assessment/Plan   Palomo Iqbal is a 33 y.o. male who is seen to evaluate for sleep apnea. The pathophysiology of sleep apnea, diagnostic testing (HST vs PSG), treatment options (PAP, oral appliance, surgery, hypoglossal nerve stimulator called Inspire), and supportive management (weight loss, positional  therapy, smoking cessation, avoidance of alcohol and sedatives) were discussed with the patient in detail. Risk factors of sleep apnea as well as cardiometabolic and neurocognitive sequelae associated with untreated sleep apnea were also discussed. Lastly, patient was advised to avoid driving vehicle or operating heavy machinery when sleepy.     Palomo Iqbal with the following problems:     # SLEEP APNEA: unspecified  -Proceed a split night to get an appropriate setting to treat his sleep apnea.  -Provide a motivational interview to help the patient understand the importance of pap compliance and prevent more comorbidities in the future. Palomo verbalized understanding.  -Sleep apnea and PAP therapy education were provided at length in the clinic today. Palomo Iqbal verbalized understanding.  -Emphasized diet, exercise, and weight loss in the clinic, as were non-supine sleep, avoiding alcohol in the late evening, and driving or operating heavy machinery when sleepy.  -Palomo TAWANNA Faridaverbalizes understanding of the above instructions and risks.    # MORBID OBESITY:   -with a BMI of 41.19. Palomo Iqbal most recent Bicarbonate was 31  -Encourage to have regular exercise to manage weight well.  -Declined to refer a nutritionist    # TOBACCO ABUSE:Palomo Iqbal is a current smoker for 15 years.  -Decline Smoking Cessation     # Hypertension  -Denies Palpitation, headache, and palpitation in the clinic.  -BP was 146/88 in the clinic.  -F/U with his PCP    RTC 2-3 weeks after sleep study      All of patient's questions were answered. He verbalizes understanding and agreement with my assessment and plan.

## 2024-03-25 ENCOUNTER — OFFICE VISIT (OUTPATIENT)
Dept: SLEEP MEDICINE | Facility: HOSPITAL | Age: 34
End: 2024-03-25
Payer: MEDICARE

## 2024-03-25 VITALS
HEART RATE: 71 BPM | OXYGEN SATURATION: 97 % | WEIGHT: 263 LBS | SYSTOLIC BLOOD PRESSURE: 146 MMHG | BODY MASS INDEX: 41.19 KG/M2 | TEMPERATURE: 97.6 F | DIASTOLIC BLOOD PRESSURE: 88 MMHG

## 2024-03-25 DIAGNOSIS — Z72.0 TOBACCO ABUSE: ICD-10-CM

## 2024-03-25 DIAGNOSIS — E66.01 OBESITY, MORBID (MULTI): ICD-10-CM

## 2024-03-25 DIAGNOSIS — I10 HYPERTENSION, UNSPECIFIED TYPE: ICD-10-CM

## 2024-03-25 DIAGNOSIS — G47.33 OBSTRUCTIVE SLEEP APNEA SYNDROME: ICD-10-CM

## 2024-03-25 DIAGNOSIS — G47.30 SLEEP APNEA, UNSPECIFIED TYPE: Primary | ICD-10-CM

## 2024-03-25 PROCEDURE — 3044F HG A1C LEVEL LT 7.0%: CPT

## 2024-03-25 PROCEDURE — 99204 OFFICE O/P NEW MOD 45 MIN: CPT

## 2024-03-25 PROCEDURE — 99214 OFFICE O/P EST MOD 30 MIN: CPT

## 2024-03-25 PROCEDURE — 4010F ACE/ARB THERAPY RXD/TAKEN: CPT

## 2024-03-25 PROCEDURE — 3077F SYST BP >= 140 MM HG: CPT

## 2024-03-25 PROCEDURE — 3079F DIAST BP 80-89 MM HG: CPT

## 2024-03-25 PROCEDURE — 3048F LDL-C <100 MG/DL: CPT

## 2024-03-25 PROCEDURE — 3060F POS MICROALBUMINURIA REV: CPT

## 2024-03-25 SDOH — ECONOMIC STABILITY: FOOD INSECURITY: WITHIN THE PAST 12 MONTHS, YOU WORRIED THAT YOUR FOOD WOULD RUN OUT BEFORE YOU GOT MONEY TO BUY MORE.: NEVER TRUE

## 2024-03-25 SDOH — ECONOMIC STABILITY: FOOD INSECURITY: WITHIN THE PAST 12 MONTHS, THE FOOD YOU BOUGHT JUST DIDN'T LAST AND YOU DIDN'T HAVE MONEY TO GET MORE.: NEVER TRUE

## 2024-03-25 ASSESSMENT — SLEEP AND FATIGUE QUESTIONNAIRES
HOW LIKELY ARE YOU TO NOD OFF OR FALL ASLEEP WHILE SITTING QUIETLY AFTER LUNCH WITHOUT ALCOHOL: MODERATE CHANCE OF DOZING
SLEEP_PROBLEM_INTERFERES_DAILY_ACTIVITIES: MUCH
HOW LIKELY ARE YOU TO NOD OFF OR FALL ASLEEP WHILE WATCHING TV: MODERATE CHANCE OF DOZING
ESS-CHAD TOTAL SCORE: 16
HOW LIKELY ARE YOU TO NOD OFF OR FALL ASLEEP WHILE LYING DOWN TO REST IN THE AFTERNOON WHEN CIRCUMSTANCES PERMIT: MODERATE CHANCE OF DOZING
WORRIED_DISTRESSED_DUE_TO_SLEEP: VERY MUCH NOTICEABLE
HOW LIKELY ARE YOU TO NOD OFF OR FALL ASLEEP WHILE SITTING AND READING: MODERATE CHANCE OF DOZING
HOW LIKELY ARE YOU TO NOD OFF OR FALL ASLEEP IN A CAR, WHILE STOPPED FOR A FEW MINUTES IN TRAFFIC: MODERATE CHANCE OF DOZING
SITING INACTIVE IN A PUBLIC PLACE LIKE A CLASS ROOM OR A MOVIE THEATER: MODERATE CHANCE OF DOZING
SATISFACTION_WITH_CURRENT_SLEEP_PATTERN: VERY SATISFIED
SLEEP_PROBLEM_NOTICEABLE_TO_OTHERS: VERY MUCH NOTICEABLE
HOW LIKELY ARE YOU TO NOD OFF OR FALL ASLEEP WHEN YOU ARE A PASSENGER IN A CAR FOR AN HOUR WITHOUT A BREAK: MODERATE CHANCE OF DOZING
HOW LIKELY ARE YOU TO NOD OFF OR FALL ASLEEP WHILE SITTING AND TALKING TO SOMEONE: MODERATE CHANCE OF DOZING

## 2024-03-25 ASSESSMENT — PATIENT HEALTH QUESTIONNAIRE - PHQ9
SUM OF ALL RESPONSES TO PHQ9 QUESTIONS 1 & 2: 0
2. FEELING DOWN, DEPRESSED OR HOPELESS: NOT AT ALL
1. LITTLE INTEREST OR PLEASURE IN DOING THINGS: NOT AT ALL

## 2024-03-25 ASSESSMENT — LIFESTYLE VARIABLES: HOW OFTEN DO YOU HAVE A DRINK CONTAINING ALCOHOL: MONTHLY OR LESS

## 2024-03-25 ASSESSMENT — PAIN SCALES - GENERAL: PAINLEVEL: 0-NO PAIN

## 2024-04-15 ENCOUNTER — OFFICE VISIT (OUTPATIENT)
Dept: PODIATRY | Facility: HOSPITAL | Age: 34
End: 2024-04-15
Payer: MEDICARE

## 2024-04-15 VITALS
DIASTOLIC BLOOD PRESSURE: 84 MMHG | HEIGHT: 66 IN | OXYGEN SATURATION: 93 % | SYSTOLIC BLOOD PRESSURE: 142 MMHG | HEART RATE: 74 BPM | BODY MASS INDEX: 41.98 KG/M2 | WEIGHT: 261.2 LBS

## 2024-04-15 DIAGNOSIS — L84 CALLUS OF FOOT: ICD-10-CM

## 2024-04-15 DIAGNOSIS — B35.3 TINEA PEDIS OF BOTH FEET: Primary | ICD-10-CM

## 2024-04-15 DIAGNOSIS — E11.69 TYPE 2 DIABETES MELLITUS WITH OTHER SPECIFIED COMPLICATION, WITHOUT LONG-TERM CURRENT USE OF INSULIN (MULTI): ICD-10-CM

## 2024-04-15 PROCEDURE — 99214 OFFICE O/P EST MOD 30 MIN: CPT | Performed by: STUDENT IN AN ORGANIZED HEALTH CARE EDUCATION/TRAINING PROGRAM

## 2024-04-15 RX ORDER — CLOTRIMAZOLE AND BETAMETHASONE DIPROPIONATE 10; .64 MG/G; MG/G
1 CREAM TOPICAL 2 TIMES DAILY
Qty: 45 G | Refills: 1 | Status: SHIPPED | OUTPATIENT
Start: 2024-04-15 | End: 2024-05-13

## 2024-04-15 ASSESSMENT — PAIN SCALES - GENERAL: PAINLEVEL: 0-NO PAIN

## 2024-04-15 NOTE — PROGRESS NOTES
"Palomo Iqbal is a 34 y.o. male  presenting for routine care    Subjective   Patient seen and examined in clinic today.  New patient visit.  Patient states he is unaware of why he is at the clinic today.  Patient states he came in because his primary care physician sent him.  Patient states that he does not have diabetes or any other comorbidities that he knows is apart from high blood pressure.  Patient does admit to some athlete's foot that is itchy at times as well as a callus that he states he shaves down with a razor.  Advised patient on alternative treatment options.  Patient denies any constitutional symptoms.  Patient has no other pedal complaints right now.       Objective     REVIEW OF SYSTEMS  Negative except where otherwise stated in HPI      Physical Exam    Objective:     Last Recorded Vitals  Blood pressure 142/84, pulse 74, height 1.676 m (5' 6\"), weight 118 kg (261 lb 3.2 oz), SpO2 93%.    Vasc: DP and PT pulses are palpable bilateral.  CFT is less than 3 seconds bilateral.  Skin temperature is warm to cool proximal to distal bilateral.  Erythema noted to the interdigital aspects of bilateral feet.  Mild edema noted.    Neuro:  Light touch is intact to the foot bilateral.  Protective sensation is intact.  There is no clonus noted.  Denies any numbness, burning or tingling.     Derm: Nails 1-5 bilateral are intact.  Skin is diffusely xerotic.  Hyperkeratosis noted to right foot medial rear foot/heel.  Webspaces are mildly macerated with moderate debris and erythema interdigitally.  There are no ulcerations, verruca or other lesions noted.      MSK: Muscle strength is 5/5 for all pedal groups tested.  Ankle joint, subtalar joint, 1st MPJ and lesser MPJ ROM is full and without pain or crepitus.  The foot type is rectus bilateral off weight bearing.  There are no structural deformities noted. No pain to palpation at feet B/L.       Relevant Results       Lab Results   Component Value Date    WBC 7.9 " "03/15/2023    HGB 14.7 03/15/2023    HCT 45.1 03/15/2023    MCV 98 03/15/2023     03/15/2023       Lab Results   Component Value Date    GLUCOSE 102 (H) 01/30/2024    CALCIUM 9.6 01/30/2024     01/30/2024    K 4.5 01/30/2024    CO2 31 01/30/2024     01/30/2024    BUN 15 01/30/2024    CREATININE 1.11 01/30/2024       Lab Results   Component Value Date    HGBA1C 6.2 (H) 01/30/2024      No results found for: \"CRP\"   Lab Results   Component Value Date    SEDRATE 5 11/19/2021     No components found for: \"CMP\"                   IMAGING REVIEW:  No results found.    ASSESSMENT & PLAN:  Assessment/Plan   1. Tinea pedis of both feet  clotrimazole-betamethasone (Lotrisone) cream      2. Type 2 diabetes mellitus with other specified complication, without long-term current use of insulin (Multi)  Referral to Podiatry      3. Callus of foot             -Patient seen and examined at in clinic. All findings discussed with patient.   -Discussed etiology and treatment options for athlete's foot.  -Rx Lotrisone.  -Recommend patient utilize pumice stone for debridement of hyperkeratotic tissue.  -Patient may follow-up as needed pending athlete's foot treatment course.    Deepak Raines DPM PGY-3      A total of 45 minutes were spent coordinating care for this patient. This time was spent doing a combination of tasks such as reviewing records including imaging, labs, previous medical records, as well as discussing the patient's diagnoses and different treatment options. We discussed risks and benefits of the treatment options to the patient satisfaction, questions were answered. Time was also spent charting for this patient.       Rosa Benjamin DPM    "

## 2024-05-30 ENCOUNTER — OFFICE VISIT (OUTPATIENT)
Dept: PRIMARY CARE | Facility: CLINIC | Age: 34
End: 2024-05-30
Payer: MEDICARE

## 2024-05-30 VITALS
DIASTOLIC BLOOD PRESSURE: 82 MMHG | TEMPERATURE: 97.6 F | SYSTOLIC BLOOD PRESSURE: 130 MMHG | BODY MASS INDEX: 41.91 KG/M2 | HEIGHT: 66 IN | RESPIRATION RATE: 18 BRPM | HEART RATE: 72 BPM | OXYGEN SATURATION: 98 % | WEIGHT: 260.8 LBS

## 2024-05-30 DIAGNOSIS — Z00.00 HEALTHCARE MAINTENANCE: Primary | ICD-10-CM

## 2024-05-30 DIAGNOSIS — E11.69 TYPE 2 DIABETES MELLITUS WITH OTHER SPECIFIED COMPLICATION, WITHOUT LONG-TERM CURRENT USE OF INSULIN (MULTI): ICD-10-CM

## 2024-05-30 DIAGNOSIS — N40.1 BPH ASSOCIATED WITH NOCTURIA: ICD-10-CM

## 2024-05-30 DIAGNOSIS — G47.33 OBSTRUCTIVE SLEEP APNEA SYNDROME: ICD-10-CM

## 2024-05-30 DIAGNOSIS — R35.1 BPH ASSOCIATED WITH NOCTURIA: ICD-10-CM

## 2024-05-30 DIAGNOSIS — I10 PRIMARY HYPERTENSION: ICD-10-CM

## 2024-05-30 PROBLEM — A41.9 SEVERE SEPSIS (MULTI): Status: ACTIVE | Noted: 2023-05-21

## 2024-05-30 PROBLEM — J18.9 PNEUMONIA OF RIGHT LOWER LOBE DUE TO INFECTIOUS ORGANISM: Status: ACTIVE | Noted: 2023-05-23

## 2024-05-30 PROBLEM — J69.0 ASPIRATION PNEUMONIA OF BOTH LOWER LOBES (MULTI): Status: ACTIVE | Noted: 2023-05-21

## 2024-05-30 PROBLEM — R65.20 SEVERE SEPSIS (MULTI): Status: ACTIVE | Noted: 2023-05-21

## 2024-05-30 PROBLEM — J96.01 ACUTE RESPIRATORY FAILURE WITH HYPOXIA (MULTI): Status: ACTIVE | Noted: 2023-05-21

## 2024-05-30 PROBLEM — R41.89 UNRESPONSIVENESS: Status: ACTIVE | Noted: 2023-05-21

## 2024-05-30 PROCEDURE — 4010F ACE/ARB THERAPY RXD/TAKEN: CPT | Performed by: INTERNAL MEDICINE

## 2024-05-30 PROCEDURE — 3075F SYST BP GE 130 - 139MM HG: CPT | Performed by: INTERNAL MEDICINE

## 2024-05-30 PROCEDURE — 3079F DIAST BP 80-89 MM HG: CPT | Performed by: INTERNAL MEDICINE

## 2024-05-30 PROCEDURE — 3060F POS MICROALBUMINURIA REV: CPT | Performed by: INTERNAL MEDICINE

## 2024-05-30 PROCEDURE — 4004F PT TOBACCO SCREEN RCVD TLK: CPT | Performed by: INTERNAL MEDICINE

## 2024-05-30 PROCEDURE — G0439 PPPS, SUBSEQ VISIT: HCPCS | Performed by: INTERNAL MEDICINE

## 2024-05-30 PROCEDURE — 3044F HG A1C LEVEL LT 7.0%: CPT | Performed by: INTERNAL MEDICINE

## 2024-05-30 PROCEDURE — 3048F LDL-C <100 MG/DL: CPT | Performed by: INTERNAL MEDICINE

## 2024-05-30 PROCEDURE — 99213 OFFICE O/P EST LOW 20 MIN: CPT | Performed by: INTERNAL MEDICINE

## 2024-05-30 RX ORDER — TAMSULOSIN HYDROCHLORIDE 0.4 MG/1
0.4 CAPSULE ORAL NIGHTLY
Qty: 90 CAPSULE | Refills: 3 | Status: SHIPPED | OUTPATIENT
Start: 2024-05-30 | End: 2025-05-30

## 2024-05-30 RX ORDER — METFORMIN HYDROCHLORIDE 500 MG/1
500 TABLET ORAL
Qty: 90 TABLET | Refills: 3 | Status: SHIPPED | OUTPATIENT
Start: 2024-05-30 | End: 2025-05-30

## 2024-05-30 SDOH — ECONOMIC STABILITY: HOUSING INSECURITY
IN THE LAST 12 MONTHS, WAS THERE A TIME WHEN YOU DID NOT HAVE A STEADY PLACE TO SLEEP OR SLEPT IN A SHELTER (INCLUDING NOW)?: NO

## 2024-05-30 SDOH — ECONOMIC STABILITY: TRANSPORTATION INSECURITY
IN THE PAST 12 MONTHS, HAS THE LACK OF TRANSPORTATION KEPT YOU FROM MEDICAL APPOINTMENTS OR FROM GETTING MEDICATIONS?: NO

## 2024-05-30 SDOH — HEALTH STABILITY: PHYSICAL HEALTH: ON AVERAGE, HOW MANY MINUTES DO YOU ENGAGE IN EXERCISE AT THIS LEVEL?: 60 MIN

## 2024-05-30 SDOH — ECONOMIC STABILITY: INCOME INSECURITY: IN THE LAST 12 MONTHS, WAS THERE A TIME WHEN YOU WERE NOT ABLE TO PAY THE MORTGAGE OR RENT ON TIME?: NO

## 2024-05-30 SDOH — HEALTH STABILITY: PHYSICAL HEALTH: ON AVERAGE, HOW MANY DAYS PER WEEK DO YOU ENGAGE IN MODERATE TO STRENUOUS EXERCISE (LIKE A BRISK WALK)?: 6 DAYS

## 2024-05-30 SDOH — ECONOMIC STABILITY: TRANSPORTATION INSECURITY
IN THE PAST 12 MONTHS, HAS LACK OF TRANSPORTATION KEPT YOU FROM MEETINGS, WORK, OR FROM GETTING THINGS NEEDED FOR DAILY LIVING?: NO

## 2024-05-30 SDOH — ECONOMIC STABILITY: GENERAL
WHICH OF THE FOLLOWING DO YOU KNOW HOW TO USE AND HAVE ACCESS TO EVERY DAY? (CHOOSE ALL THAT APPLY): DESKTOP COMPUTER, LAPTOP COMPUTER, OR TABLET WITH BROADBAND INTERNET CONNECTION;SMARTPHONE WITH CELLULAR DATA PLAN

## 2024-05-30 ASSESSMENT — ANXIETY QUESTIONNAIRES
GAD7 TOTAL SCORE: 0
2. NOT BEING ABLE TO STOP OR CONTROL WORRYING: NOT AT ALL
IF YOU CHECKED OFF ANY PROBLEMS ON THIS QUESTIONNAIRE, HOW DIFFICULT HAVE THESE PROBLEMS MADE IT FOR YOU TO DO YOUR WORK, TAKE CARE OF THINGS AT HOME, OR GET ALONG WITH OTHER PEOPLE: NOT DIFFICULT AT ALL
7. FEELING AFRAID AS IF SOMETHING AWFUL MIGHT HAPPEN: NOT AT ALL
3. WORRYING TOO MUCH ABOUT DIFFERENT THINGS: NOT AT ALL
6. BECOMING EASILY ANNOYED OR IRRITABLE: NOT AT ALL
5. BEING SO RESTLESS THAT IT IS HARD TO SIT STILL: NOT AT ALL
4. TROUBLE RELAXING: NOT AT ALL
1. FEELING NERVOUS, ANXIOUS, OR ON EDGE: NOT AT ALL

## 2024-05-30 ASSESSMENT — ENCOUNTER SYMPTOMS
OCCASIONAL FEELINGS OF UNSTEADINESS: 0
PALPITATIONS: 0
NAUSEA: 0
LOSS OF SENSATION IN FEET: 0
SORE THROAT: 0
ACTIVITY CHANGE: 0
WEAKNESS: 0
MYALGIAS: 0
AGITATION: 0
SHORTNESS OF BREATH: 0
SINUS PRESSURE: 0
CHILLS: 0
DIARRHEA: 0
CHEST TIGHTNESS: 0
DEPRESSION: 0

## 2024-05-30 ASSESSMENT — LIFESTYLE VARIABLES
SKIP TO QUESTIONS 9-10: 1
HOW OFTEN DO YOU HAVE SIX OR MORE DRINKS ON ONE OCCASION: NEVER
AUDIT-C TOTAL SCORE: 0
HOW OFTEN DO YOU HAVE A DRINK CONTAINING ALCOHOL: NEVER
HOW MANY STANDARD DRINKS CONTAINING ALCOHOL DO YOU HAVE ON A TYPICAL DAY: PATIENT DOES NOT DRINK

## 2024-05-30 ASSESSMENT — SOCIAL DETERMINANTS OF HEALTH (SDOH)
WITHIN THE LAST YEAR, HAVE YOU BEEN KICKED, HIT, SLAPPED, OR OTHERWISE PHYSICALLY HURT BY YOUR PARTNER OR EX-PARTNER?: NO
ARE YOU MARRIED, WIDOWED, DIVORCED, SEPARATED, NEVER MARRIED, OR LIVING WITH A PARTNER?: PATIENT DECLINED
WITHIN THE LAST YEAR, HAVE YOU BEEN AFRAID OF YOUR PARTNER OR EX-PARTNER?: NO
WITHIN THE LAST YEAR, HAVE TO BEEN RAPED OR FORCED TO HAVE ANY KIND OF SEXUAL ACTIVITY BY YOUR PARTNER OR EX-PARTNER?: NO
HOW OFTEN DO YOU ATTENT MEETINGS OF THE CLUB OR ORGANIZATION YOU BELONG TO?: PATIENT DECLINED
HOW OFTEN DO YOU GET TOGETHER WITH FRIENDS OR RELATIVES?: MORE THAN THREE TIMES A WEEK
IN A TYPICAL WEEK, HOW MANY TIMES DO YOU TALK ON THE PHONE WITH FAMILY, FRIENDS, OR NEIGHBORS?: MORE THAN THREE TIMES A WEEK
DO YOU BELONG TO ANY CLUBS OR ORGANIZATIONS SUCH AS CHURCH GROUPS UNIONS, FRATERNAL OR ATHLETIC GROUPS, OR SCHOOL GROUPS?: PATIENT DECLINED
IN THE PAST 12 MONTHS, HAS THE ELECTRIC, GAS, OIL, OR WATER COMPANY THREATENED TO SHUT OFF SERVICE IN YOUR HOME?: NO
HOW OFTEN DO YOU ATTEND CHURCH OR RELIGIOUS SERVICES?: MORE THAN 4 TIMES PER YEAR
WITHIN THE LAST YEAR, HAVE YOU BEEN HUMILIATED OR EMOTIONALLY ABUSED IN OTHER WAYS BY YOUR PARTNER OR EX-PARTNER?: NO
HOW HARD IS IT FOR YOU TO PAY FOR THE VERY BASICS LIKE FOOD, HOUSING, MEDICAL CARE, AND HEATING?: NOT VERY HARD

## 2024-05-30 ASSESSMENT — ACTIVITIES OF DAILY LIVING (ADL)
TAKING_MEDICATION: NEEDS ASSISTANCE
DOING_HOUSEWORK: NEEDS ASSISTANCE
MANAGING_FINANCES: NEEDS ASSISTANCE
DRESSING: INDEPENDENT
BATHING: INDEPENDENT
TAKING_MEDICATION: NEEDS ASSISTANCE
GROCERY_SHOPPING: NEEDS ASSISTANCE
BATHING: INDEPENDENT
DOING_HOUSEWORK: NEEDS ASSISTANCE
GROCERY_SHOPPING: NEEDS ASSISTANCE
MANAGING_FINANCES: INDEPENDENT
DRESSING: INDEPENDENT

## 2024-05-30 ASSESSMENT — COLUMBIA-SUICIDE SEVERITY RATING SCALE - C-SSRS
1. IN THE PAST MONTH, HAVE YOU WISHED YOU WERE DEAD OR WISHED YOU COULD GO TO SLEEP AND NOT WAKE UP?: NO
6. HAVE YOU EVER DONE ANYTHING, STARTED TO DO ANYTHING, OR PREPARED TO DO ANYTHING TO END YOUR LIFE?: NO
2. HAVE YOU ACTUALLY HAD ANY THOUGHTS OF KILLING YOURSELF?: NO

## 2024-05-30 ASSESSMENT — PATIENT HEALTH QUESTIONNAIRE - PHQ9
1. LITTLE INTEREST OR PLEASURE IN DOING THINGS: NOT AT ALL
2. FEELING DOWN, DEPRESSED OR HOPELESS: NOT AT ALL
1. LITTLE INTEREST OR PLEASURE IN DOING THINGS: NOT AT ALL
2. FEELING DOWN, DEPRESSED OR HOPELESS: NOT AT ALL
SUM OF ALL RESPONSES TO PHQ9 QUESTIONS 1 AND 2: 0
SUM OF ALL RESPONSES TO PHQ9 QUESTIONS 1 AND 2: 0

## 2024-05-30 ASSESSMENT — PAIN SCALES - GENERAL: PAINLEVEL: 0-NO PAIN

## 2024-05-30 NOTE — PROGRESS NOTES
"Subjective   Patient ID: Palomo Iqbal is a 34 y.o. male who presents for Medicare Annual Wellness Visit Subsequent.  The patient continues to smoke and currently is not willing to stop.  He is also not regularly wearing his CPAP machine.  He takes his medication because it is dispensed to him.  He is still getting up multiple times at night to go the restroom.  Even though the tamsulosin was on his list he was not taking it.    HPI     Review of Systems   Constitutional:  Negative for activity change and chills.   HENT:  Negative for congestion, sinus pressure and sore throat.    Respiratory:  Negative for chest tightness and shortness of breath.    Cardiovascular:  Negative for chest pain and palpitations.   Gastrointestinal:  Negative for diarrhea and nausea.   Musculoskeletal:  Negative for myalgias.   Skin: Negative.    Neurological:  Negative for weakness.   Psychiatric/Behavioral:  Negative for agitation and behavioral problems.        Objective   /82   Pulse 72   Temp 36.4 °C (97.6 °F)   Resp 18   Ht 1.676 m (5' 6\")   Wt 118 kg (260 lb 12.8 oz)   SpO2 98%   BMI 42.09 kg/m²     Physical Exam  Constitutional:       Appearance: Normal appearance.   HENT:      Head: Normocephalic and atraumatic.      Nose: Nose normal.      Mouth/Throat:      Mouth: Mucous membranes are moist.   Eyes:      Extraocular Movements: Extraocular movements intact.      Pupils: Pupils are equal, round, and reactive to light.   Cardiovascular:      Rate and Rhythm: Normal rate and regular rhythm.   Pulmonary:      Effort: No respiratory distress.      Breath sounds: No wheezing.   Abdominal:      General: Bowel sounds are normal.      Palpations: Abdomen is soft.   Neurological:      General: No focal deficit present.       Assessment/Plan   Problem List Items Addressed This Visit             ICD-10-CM    Diabetes mellitus (Multi) E11.9    Relevant Medications    metFORMIN (Glucophage) 500 mg tablet    Sleep apnea G47.30 "    Hypertension - Primary I10     Other Visit Diagnoses         Codes    BPH associated with nocturia     N40.1, R35.1    Relevant Medications    tamsulosin (Flomax) 0.4 mg 24 hr capsule        He will start taking the tamsulosin at bedtime as well as start taking the metformin again for his diabetes.  He was taking the losartan for his blood pressure.  He will consider stopping smoking.

## 2024-06-25 ENCOUNTER — APPOINTMENT (OUTPATIENT)
Dept: SLEEP MEDICINE | Facility: HOSPITAL | Age: 34
End: 2024-06-25
Payer: MEDICARE

## 2024-12-18 NOTE — PROGRESS NOTES
12/19/2024 CC: 34 y.o. presents for Comprehensive eval.     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 Red flags discussed to seek medical attention/ED visit    Refractive error:  -Myopia, ast.  -Updated Mrx provided    T2DM wo DR  - Last A1c 6.2 (1/2024)  - The patient has diabetes without any evidence of retinopathy.  The patient was advised to maintain tight glucose control, tight blood pressure control, and favorable levels of cholesterol, low density lipoprotein, and high density lipoproteins.  Follow up in one year was recommended.       Plan:   I explained my findings. Papilledema OU. Mrx dispensed    Eye medications:   Both eyes: none    RTC neuro-ophthalmology

## 2024-12-19 ENCOUNTER — APPOINTMENT (OUTPATIENT)
Dept: OPHTHALMOLOGY | Facility: CLINIC | Age: 34
End: 2024-12-19
Payer: MEDICARE

## 2024-12-19 DIAGNOSIS — H52.13 MYOPIA OF BOTH EYES WITH ASTIGMATISM: ICD-10-CM

## 2024-12-19 DIAGNOSIS — E11.9 DIABETES MELLITUS TYPE 2 WITHOUT RETINOPATHY (MULTI): Primary | ICD-10-CM

## 2024-12-19 DIAGNOSIS — H52.203 MYOPIA OF BOTH EYES WITH ASTIGMATISM: ICD-10-CM

## 2024-12-19 DIAGNOSIS — H47.10 OPTIC DISC EDEMA: ICD-10-CM

## 2024-12-19 PROCEDURE — 99204 OFFICE O/P NEW MOD 45 MIN: CPT | Performed by: OPHTHALMOLOGY

## 2024-12-19 PROCEDURE — 92015 DETERMINE REFRACTIVE STATE: CPT | Performed by: OPHTHALMOLOGY

## 2024-12-19 PROCEDURE — 92133 CPTRZD OPH DX IMG PST SGM ON: CPT | Performed by: OPHTHALMOLOGY

## 2024-12-19 ASSESSMENT — ENCOUNTER SYMPTOMS
ALLERGIC/IMMUNOLOGIC NEGATIVE: 0
ENDOCRINE NEGATIVE: 1
MUSCULOSKELETAL NEGATIVE: 0
CONSTITUTIONAL NEGATIVE: 0
RESPIRATORY NEGATIVE: 0
HEMATOLOGIC/LYMPHATIC NEGATIVE: 0
NEUROLOGICAL NEGATIVE: 0
GASTROINTESTINAL NEGATIVE: 0
EYES NEGATIVE: 1
PSYCHIATRIC NEGATIVE: 0
CARDIOVASCULAR NEGATIVE: 0

## 2024-12-19 ASSESSMENT — REFRACTION_MANIFEST
OS_SPHERE: -2.00
OS_AXIS: 017
OD_SPHERE: -1.75
METHOD_AUTOREFRACTION: 1
OD_CYLINDER: +1.25
OS_SPHERE: -2.00
OD_AXIS: 177
OS_AXIS: 017
OS_CYLINDER: +1.25
OD_SPHERE: -2.00
OD_CYLINDER: +1.50
OD_AXIS: 177
OS_CYLINDER: +1.24

## 2024-12-19 ASSESSMENT — TONOMETRY
OS_IOP_MMHG: 10
IOP_METHOD: GOLDMANN APPLANATION
OD_IOP_MMHG: 10

## 2024-12-19 ASSESSMENT — SLIT LAMP EXAM - LIDS
COMMENTS: NORMAL
COMMENTS: NORMAL

## 2024-12-19 ASSESSMENT — CONF VISUAL FIELD
OS_NORMAL: 1
OD_SUPERIOR_NASAL_RESTRICTION: 0
OS_SUPERIOR_TEMPORAL_RESTRICTION: 0
OS_INFERIOR_TEMPORAL_RESTRICTION: 0
OS_SUPERIOR_NASAL_RESTRICTION: 0
OD_SUPERIOR_TEMPORAL_RESTRICTION: 0
OD_INFERIOR_TEMPORAL_RESTRICTION: 0
OD_INFERIOR_NASAL_RESTRICTION: 0
OS_INFERIOR_NASAL_RESTRICTION: 0
OD_NORMAL: 1

## 2024-12-19 ASSESSMENT — EXTERNAL EXAM - RIGHT EYE: OD_EXAM: NORMAL

## 2024-12-19 ASSESSMENT — VISUAL ACUITY
OS_PH_SC: 20/30-1
OS_SC: 20/40
METHOD: SNELLEN - LINEAR
OD_PH_SC: 20/30+2
OD_SC: 20/50+1

## 2024-12-19 ASSESSMENT — EXTERNAL EXAM - LEFT EYE: OS_EXAM: NORMAL

## 2024-12-19 ASSESSMENT — CUP TO DISC RATIO
OS_RATIO: 0.4
OD_RATIO: 0.4

## 2024-12-19 NOTE — Clinical Note
Hello,   This patient has evidence of optic disc edema in both eyes concerning for elevated intracranial pressure, please coordinate neurology follow up for this patient, thanks in advance.

## 2025-01-28 ENCOUNTER — APPOINTMENT (OUTPATIENT)
Dept: PRIMARY CARE | Facility: CLINIC | Age: 35
End: 2025-01-28
Payer: MEDICARE

## 2025-01-28 VITALS
WEIGHT: 220 LBS | BODY MASS INDEX: 35.36 KG/M2 | SYSTOLIC BLOOD PRESSURE: 100 MMHG | HEART RATE: 62 BPM | DIASTOLIC BLOOD PRESSURE: 69 MMHG | OXYGEN SATURATION: 93 % | HEIGHT: 66 IN

## 2025-01-28 DIAGNOSIS — Z98.2 VP (VENTRICULOPERITONEAL) SHUNT STATUS: ICD-10-CM

## 2025-01-28 DIAGNOSIS — E88.42 MERRF (MYOCLONUS EPILEPSY AND RAGGED RED FIBERS) (MULTI): ICD-10-CM

## 2025-01-28 DIAGNOSIS — R35.1 BPH ASSOCIATED WITH NOCTURIA: ICD-10-CM

## 2025-01-28 DIAGNOSIS — E55.9 HYPOVITAMINOSIS D: ICD-10-CM

## 2025-01-28 DIAGNOSIS — G40.309 MERRF (MYOCLONUS EPILEPSY AND RAGGED RED FIBERS) (MULTI): ICD-10-CM

## 2025-01-28 DIAGNOSIS — N40.1 BPH ASSOCIATED WITH NOCTURIA: ICD-10-CM

## 2025-01-28 DIAGNOSIS — E11.69 TYPE 2 DIABETES MELLITUS WITH OTHER SPECIFIED COMPLICATION, WITHOUT LONG-TERM CURRENT USE OF INSULIN: Primary | ICD-10-CM

## 2025-01-28 PROBLEM — J96.01 ACUTE RESPIRATORY FAILURE WITH HYPOXIA (MULTI): Status: RESOLVED | Noted: 2023-05-21 | Resolved: 2025-01-28

## 2025-01-28 PROBLEM — I46.9 CARDIAC ARREST, CAUSE UNSPECIFIED: Status: RESOLVED | Noted: 2024-01-31 | Resolved: 2025-01-28

## 2025-01-28 PROBLEM — E66.01 OBESITY, MORBID (MULTI): Status: RESOLVED | Noted: 2024-01-31 | Resolved: 2025-01-28

## 2025-01-28 PROBLEM — F32.1 MODERATE MAJOR DEPRESSION (MULTI): Status: RESOLVED | Noted: 2023-03-02 | Resolved: 2025-01-28

## 2025-01-28 PROBLEM — T40.2X1A OPIOID OVERDOSE (MULTI): Status: RESOLVED | Noted: 2023-07-07 | Resolved: 2025-01-28

## 2025-01-28 PROCEDURE — 3074F SYST BP LT 130 MM HG: CPT | Performed by: INTERNAL MEDICINE

## 2025-01-28 PROCEDURE — 3008F BODY MASS INDEX DOCD: CPT | Performed by: INTERNAL MEDICINE

## 2025-01-28 PROCEDURE — 99213 OFFICE O/P EST LOW 20 MIN: CPT | Performed by: INTERNAL MEDICINE

## 2025-01-28 PROCEDURE — 4010F ACE/ARB THERAPY RXD/TAKEN: CPT | Performed by: INTERNAL MEDICINE

## 2025-01-28 PROCEDURE — 99395 PREV VISIT EST AGE 18-39: CPT | Performed by: INTERNAL MEDICINE

## 2025-01-28 PROCEDURE — 3078F DIAST BP <80 MM HG: CPT | Performed by: INTERNAL MEDICINE

## 2025-01-28 ASSESSMENT — ENCOUNTER SYMPTOMS
MYALGIAS: 0
HEADACHES: 1
AGITATION: 0
SHORTNESS OF BREATH: 0
CHILLS: 0
WEAKNESS: 0
ACTIVITY CHANGE: 0
SORE THROAT: 0
DEPRESSION: 0
SINUS PRESSURE: 0
CHEST TIGHTNESS: 0
NAUSEA: 0
DIARRHEA: 0
PALPITATIONS: 0

## 2025-01-28 ASSESSMENT — PAIN SCALES - GENERAL: PAINLEVEL_OUTOF10: 0-NO PAIN

## 2025-01-28 NOTE — ASSESSMENT & PLAN NOTE
We will see how his vitamin D level is.  Orders:    Vitamin D 25-Hydroxy,Total (for eval of Vitamin D levels); Future

## 2025-01-28 NOTE — LETTER
January 28, 2025     Patient: Palomo Iqbal   YOB: 1990   Date of Visit: 1/28/2025       To Whom It May Concern:    Palomo Iqbal was seen in my clinic on 1/28/2025 at 11:30 am. Please excuse Palomo for his absence from work on this day to make the appointment.    If you have any questions or concerns, please don't hesitate to call.         Sincerely,         Osman Anderson MD        CC: No Recipients

## 2025-01-28 NOTE — PROGRESS NOTES
"Subjective   Patient ID: Palomo Iqbal is a 34 y.o. male who presents for Follow-up (8 month).  The patient has a history of diabetes and hypertension and presents for follow-up.  The patient is requesting to have his shunt cleaned out.  I am not familiar with the dynamics surrounding this, but he has not seen a neurologist or neurosurgeon to my knowledge.  And he has not seen a neurologist recently, so I will refer him to one now.  He denies any related symptoms.    HPI     Review of Systems   Constitutional:  Negative for activity change and chills.   HENT:  Negative for congestion, sinus pressure and sore throat.    Respiratory:  Negative for chest tightness and shortness of breath.    Cardiovascular:  Negative for chest pain and palpitations.   Gastrointestinal:  Negative for diarrhea and nausea.   Musculoskeletal:  Negative for myalgias.   Neurological:  Positive for headaches. Negative for weakness.   Psychiatric/Behavioral:  Negative for agitation and behavioral problems.        Objective   /69 (BP Location: Left arm, Patient Position: Sitting, BP Cuff Size: Large adult)   Pulse 62   Ht 1.676 m (5' 6\")   Wt 99.8 kg (220 lb)   SpO2 93%   BMI 35.51 kg/m²     Physical Exam  Constitutional:       Appearance: Normal appearance.   HENT:      Head: Normocephalic and atraumatic.      Nose: Nose normal.      Mouth/Throat:      Mouth: Mucous membranes are moist.   Eyes:      Extraocular Movements: Extraocular movements intact.      Pupils: Pupils are equal, round, and reactive to light.   Cardiovascular:      Rate and Rhythm: Normal rate and regular rhythm.   Pulmonary:      Effort: No respiratory distress.      Breath sounds: No wheezing.   Abdominal:      General: Bowel sounds are normal.      Palpations: Abdomen is soft.   Neurological:      General: No focal deficit present.       Assessment/Plan   Assessment & Plan  Type 2 diabetes mellitus with other specified complication, without long-term current " use of insulin  We will check his control of his diabetes.  Orders:    Comprehensive Metabolic Panel; Future    Albumin-Creatinine Ratio, Urine Random; Future    Hemoglobin A1C; Future    Lipid Panel; Future    Hypovitaminosis D  We will see how his vitamin D level is.  Orders:    Vitamin D 25-Hydroxy,Total (for eval of Vitamin D levels); Future    BPH associated with nocturia  He has been doing well as far as urinating.       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  Orders:    Referral to Neurology; Future     (ventriculoperitoneal) shunt status  We will evaluate the need for irrigation as well.  Orders:    Referral to Neurology; Future

## 2025-01-28 NOTE — ASSESSMENT & PLAN NOTE
We will check his control of his diabetes.  Orders:    Comprehensive Metabolic Panel; Future    Albumin-Creatinine Ratio, Urine Random; Future    Hemoglobin A1C; Future    Lipid Panel; Future

## 2025-02-03 LAB
ALBUMIN/CREAT UR: 309 MG/G CREAT
CREAT UR-MCNC: 266 MG/DL (ref 20–320)
MICROALBUMIN UR-MCNC: 82.3 MG/DL
QUEST TRACKING HOUSE ACCOUNT: NORMAL

## 2025-04-17 DIAGNOSIS — N40.1 BPH ASSOCIATED WITH NOCTURIA: ICD-10-CM

## 2025-04-17 DIAGNOSIS — E11.69 TYPE 2 DIABETES MELLITUS WITH OTHER SPECIFIED COMPLICATION, WITHOUT LONG-TERM CURRENT USE OF INSULIN: ICD-10-CM

## 2025-04-17 DIAGNOSIS — R35.1 BPH ASSOCIATED WITH NOCTURIA: ICD-10-CM

## 2025-04-17 RX ORDER — TAMSULOSIN HYDROCHLORIDE 0.4 MG/1
CAPSULE ORAL
Qty: 90 CAPSULE | Refills: 2 | Status: SHIPPED | OUTPATIENT
Start: 2025-04-17

## 2025-04-17 RX ORDER — LOSARTAN POTASSIUM 25 MG/1
25 TABLET ORAL DAILY
Qty: 90 TABLET | Refills: 3 | Status: SHIPPED | OUTPATIENT
Start: 2025-04-17

## 2025-04-22 ENCOUNTER — APPOINTMENT (OUTPATIENT)
Dept: OPHTHALMOLOGY | Facility: CLINIC | Age: 35
End: 2025-04-22
Payer: MEDICARE

## 2025-06-03 ENCOUNTER — APPOINTMENT (OUTPATIENT)
Dept: PRIMARY CARE | Facility: CLINIC | Age: 35
End: 2025-06-03
Payer: MEDICARE

## 2025-07-25 DIAGNOSIS — E11.69 TYPE 2 DIABETES MELLITUS WITH OTHER SPECIFIED COMPLICATION, WITHOUT LONG-TERM CURRENT USE OF INSULIN: ICD-10-CM

## 2025-07-28 RX ORDER — METFORMIN HYDROCHLORIDE 500 MG/1
500 TABLET ORAL
Qty: 90 TABLET | Refills: 3 | Status: SHIPPED | OUTPATIENT
Start: 2025-07-28

## 2025-09-23 ENCOUNTER — APPOINTMENT (OUTPATIENT)
Dept: PRIMARY CARE | Facility: CLINIC | Age: 35
End: 2025-09-23
Payer: MEDICARE